# Patient Record
Sex: FEMALE | Race: BLACK OR AFRICAN AMERICAN | NOT HISPANIC OR LATINO | Employment: UNEMPLOYED | URBAN - METROPOLITAN AREA
[De-identification: names, ages, dates, MRNs, and addresses within clinical notes are randomized per-mention and may not be internally consistent; named-entity substitution may affect disease eponyms.]

---

## 2024-02-15 PROBLEM — E04.9 ENLARGED THYROID: Status: ACTIVE | Noted: 2024-02-15

## 2024-02-15 PROBLEM — E55.9 VITAMIN D DEFICIENCY: Status: ACTIVE | Noted: 2024-02-15

## 2024-03-19 ENCOUNTER — PATIENT MESSAGE (OUTPATIENT)
Dept: ADMINISTRATIVE | Facility: HOSPITAL | Age: 74
End: 2024-03-19

## 2024-04-18 ENCOUNTER — PATIENT OUTREACH (OUTPATIENT)
Dept: ADMINISTRATIVE | Facility: OTHER | Age: 74
End: 2024-04-18

## 2024-04-18 PROBLEM — R73.03 PREDIABETES: Status: ACTIVE | Noted: 2024-04-18

## 2024-04-18 PROBLEM — M81.0 AGE RELATED OSTEOPOROSIS: Status: ACTIVE | Noted: 2024-04-18

## 2024-04-18 NOTE — PROGRESS NOTES
CHW - Initial Contact    This Community Health Worker completed the Social Determinant of Health questionnaire with patient during clinic visit today.    Pt identified barriers of most importance are: patient identified no barriers of importance during clinic visit    Referrals to community agencies completed with patient consent outside of [facility] include: No community referral needed during clinic visit   Referrals were put through [facility] - no:   Support and Services: No support services needed during clinic visit   Other information discussed the patient needs help with: patient discussed no other information during clinic visit    Follow up required: yes  Initial Outreach, Follow-up Outreach - Due: 4/18/2024, 5/2/2024

## 2024-06-24 ENCOUNTER — PATIENT OUTREACH (OUTPATIENT)
Dept: ADMINISTRATIVE | Facility: OTHER | Age: 74
End: 2024-06-24

## 2024-06-24 NOTE — PROGRESS NOTES
CHW - Follow Up    This Community Health Worker completed a follow up visit with patient via telephone today.  Pt reported: patient reported she is doing fine no assistance is needed during follow up phone interview.   Patient also reminded of upcoming appointment  Community Health Worker provided: CHW spoke with patient she is doing fine  Follow up required: No  No future outreach task assigned

## 2024-06-24 NOTE — PROGRESS NOTES
CHW - Case Closure    This Community Health Worker spoke to patient via telephone today.   Pt reported: patient reported she is doing fine no assistance is needed during follow up phone interview.   Patient will reach out if assistance is needed in the future. Patient was reminded of up coming appointment with provider.   Pt denied any additional needs at this time and agrees with episode closure at this time.    Provided patient with Community Health Worker's contact information and encouraged  her to contact this Community Health Worker if additional needs arise.

## 2024-09-19 ENCOUNTER — PATIENT OUTREACH (OUTPATIENT)
Dept: ADMINISTRATIVE | Facility: OTHER | Age: 74
End: 2024-09-19

## 2024-09-19 NOTE — PROGRESS NOTES
CHW - Initial Contact    This Community Health Worker completed the Social Determinant of Health questionnaire with patient during clinic visit today.    Pt identified barriers of most importance are: patient identified no barriers of importance during clinic visit     Referrals to community agencies completed with patient consent outside of [facility] include: No community referral needed during clinic visit   Referrals were put through [facility] - no:   Support and Services: No support services needed during clinic visit   Other information discussed the patient needs help with: patient discussed no other information during clinic visit   Follow up required: yes  Follow-up Outreach - Due: 10/17/2024

## 2024-11-25 ENCOUNTER — PATIENT OUTREACH (OUTPATIENT)
Dept: ADMINISTRATIVE | Facility: OTHER | Age: 74
End: 2024-11-25

## 2024-11-25 NOTE — PROGRESS NOTES
CHW - Follow Up    This Community Health Worker completed a follow up visit with patient via telephone today.  Pt/Caregiver reported: patient reported she is doing well no assistance is needed during follow up phone interview.  Patient will reach out if assistance is needed in the future. Patient was reminded of upcoming appointment with   Provider. Patient also had question for provider message given  to provider MA  Community Health Worker provided:   Follow up required: No  Case Closure - Due: 11/25/2024                 CHW - Case Closure    This Community Health Worker spoke to patient via telephone today.   Pt reported: patient reported she is doing well no assistance is needed during follow up phone interview.  Patient will reach out if assistance is needed in the future. Patient was reminded of upcoming appointment with   Provider. Patient also had question for provider message given  to provider MA  Pt denied any additional needs at this time and agrees with episode closure at this time.    Provided patient with Community Health Worker's contact information and encouraged   her to contact this Community Health Worker if additional needs arise.

## 2025-04-06 ENCOUNTER — PATIENT MESSAGE (OUTPATIENT)
Dept: ADMINISTRATIVE | Facility: HOSPITAL | Age: 75
End: 2025-04-06

## 2025-06-25 ENCOUNTER — PATIENT MESSAGE (OUTPATIENT)
Dept: ADMINISTRATIVE | Facility: HOSPITAL | Age: 75
End: 2025-06-25

## 2025-07-15 ENCOUNTER — PATIENT OUTREACH (OUTPATIENT)
Dept: ADMINISTRATIVE | Facility: HOSPITAL | Age: 75
End: 2025-07-15

## 2025-07-15 DIAGNOSIS — Z12.31 BREAST CANCER SCREENING BY MAMMOGRAM: Primary | ICD-10-CM
